# Patient Record
Sex: FEMALE
[De-identification: names, ages, dates, MRNs, and addresses within clinical notes are randomized per-mention and may not be internally consistent; named-entity substitution may affect disease eponyms.]

---

## 2020-09-04 ENCOUNTER — APPOINTMENT (OUTPATIENT)
Dept: MRI IMAGING | Facility: CLINIC | Age: 65
End: 2020-09-04

## 2020-09-05 ENCOUNTER — TRANSCRIPTION ENCOUNTER (OUTPATIENT)
Age: 65
End: 2020-09-05

## 2020-09-14 ENCOUNTER — APPOINTMENT (OUTPATIENT)
Dept: ORTHOPEDIC SURGERY | Facility: CLINIC | Age: 65
End: 2020-09-14

## 2021-02-22 ENCOUNTER — TRANSCRIPTION ENCOUNTER (OUTPATIENT)
Age: 66
End: 2021-02-22

## 2021-08-25 DIAGNOSIS — Z00.00 ENCOUNTER FOR GENERAL ADULT MEDICAL EXAMINATION W/OUT ABNORMAL FINDINGS: ICD-10-CM

## 2021-09-01 ENCOUNTER — APPOINTMENT (OUTPATIENT)
Dept: ORTHOPEDIC SURGERY | Facility: CLINIC | Age: 66
End: 2021-09-01
Payer: MEDICARE

## 2021-09-01 ENCOUNTER — NON-APPOINTMENT (OUTPATIENT)
Age: 66
End: 2021-09-01

## 2021-09-01 VITALS
HEART RATE: 79 BPM | BODY MASS INDEX: 19.44 KG/M2 | HEIGHT: 60 IN | DIASTOLIC BLOOD PRESSURE: 69 MMHG | SYSTOLIC BLOOD PRESSURE: 155 MMHG | WEIGHT: 99 LBS

## 2021-09-01 DIAGNOSIS — M70.61 TROCHANTERIC BURSITIS, RIGHT HIP: ICD-10-CM

## 2021-09-01 DIAGNOSIS — M70.62 TROCHANTERIC BURSITIS, LEFT HIP: ICD-10-CM

## 2021-09-01 PROCEDURE — 99204 OFFICE O/P NEW MOD 45 MIN: CPT

## 2021-09-01 PROCEDURE — 73523 X-RAY EXAM HIPS BI 5/> VIEWS: CPT

## 2021-09-01 NOTE — PHYSICAL EXAM
[de-identified] : Patient is well nourished, well-developed, in no acute distress, with appropriate mood and affect. The patient is oriented to time, place, and person. Respirations are even and unlabored. Gait evaluation does not reveal a limp. There is no inguinal adenopathy.  The right limb is well-perfused and showed 2+ dp/pt pulses, without skin lesions, shows a grossly normal motor and sensory examination. Examination of the hip shows no skin lesions. Hip motion is full and painless from 0-90 degrees extension to flexion, 20 degrees adduction and 20 degrees abduction, and 15 degrees internal and 30 degrees external rotation. FADIR is negative and CRISTÓBAL is negative. Stinchfield test is negative. The left limb is well-perfused and showed 2+ dp/pt pulses, without skin lesions, shows a grossly normal motor and sensory examination. Examination of the hip shows no skin lesions. Hip motion is full and painless from 0-90 degrees extension to flexion, 20 degrees adduction and 20 degrees abduction, and 15 degrees internal and 30 degrees external rotation. FADIR is negative and CRISTÓBAL is negative. Stinchfield test is negative.  Leg lengths are approximately equal. Both hips are stable and muscle strength is normal with good strength with resisted abduction and adduction. Pedal pulses are palpable.  Palpation to the lateral aspect of the hip reproduces pain.  [de-identified] : AP and lateral x-rays of the bilateral hip, pelvis, and femur were ordered and taken in the office and demonstrate no evidence of degenerative joint disease of the hip with maintained joint space and no evidence of fractures or other intraarticular pathology.

## 2021-09-01 NOTE — HISTORY OF PRESENT ILLNESS
[de-identified] : This is a very nice  66 year old  female experiencing pain in the lateral aspect of both hips hip which is moderate - severe in intensity and has been going on for at least over a year. She has had 2 cortisone injections over the last 1-1/2 yrs. She has tried PT, foam roller and NSAIDs as well as Votaren Gel. The pain somewhat limits activities of daily living. Walking tolerance is reduced. Medication and activity modification have been minimally effective. The patient does not report any pain in the groin.  The patient denies any radiation of the pain to the feet and it is not associated with numbness, tingling, or weakness.

## 2021-09-01 NOTE — DISCUSSION/SUMMARY
[de-identified] : The patient has bilat hip GTB They are not an appropriate candidate for surgical intervention at this time. An extensive discussion was conducted on the natural history of the disease and the variety of surgical and non-surgical options available to the patient including, but not limited to non-steroidal anti-inflammatory medications, steroid injections, physical therapy, maintenance of ideal body weight, and reduction of activity. I recommended and prescribed a course of Mobic and physical therapy. The patient will schedule an appointment as needed.

## 2021-09-03 RX ORDER — MELOXICAM 15 MG/1
15 TABLET ORAL
Qty: 30 | Refills: 1 | Status: ACTIVE | COMMUNITY
Start: 2021-09-03 | End: 1900-01-01

## 2022-07-06 ENCOUNTER — NON-APPOINTMENT (OUTPATIENT)
Age: 67
End: 2022-07-06

## 2022-11-09 ENCOUNTER — APPOINTMENT (OUTPATIENT)
Dept: ORTHOPEDIC SURGERY | Facility: CLINIC | Age: 67
End: 2022-11-09

## 2022-11-09 PROCEDURE — 99213 OFFICE O/P EST LOW 20 MIN: CPT

## 2022-11-09 PROCEDURE — 73140 X-RAY EXAM OF FINGER(S): CPT | Mod: RT

## 2022-11-09 PROCEDURE — 99203 OFFICE O/P NEW LOW 30 MIN: CPT

## 2022-11-09 NOTE — IMAGING
[de-identified] : Right thumb\par Palpable nodule located at the level of the A1 pulley, TTP\par No erythema, swelling or wounds\par Normal muscle tone/ bulk\par Triggering is observed\par + FPL/ EPL\par SILT throughout\par wwp\par

## 2022-11-09 NOTE — DISCUSSION/SUMMARY
[Medication Risks Reviewed] : Medication risks reviewed [Surgical risks reviewed] : Surgical risks reviewed [de-identified] : - discussed etiology/ pathophysiology of trigger thumb\par - reviewed treatment options, conservative and operative\par - reviewed r/b/a to these\par - pt request Right trigger injection today\par - f/u in 2 wks\par

## 2022-11-09 NOTE — HISTORY OF PRESENT ILLNESS
[Sudden] : sudden [4] : 4 [1] : 2 [Dull/Aching] : dull/aching [Localized] : localized [de-identified] : 66yo RHD F w/ 3 weeks of right thumb pain and triggering. No injuries, n/t. [] : no [FreeTextEntry1] : right thumb  [FreeTextEntry5] : pain started about a month ago after starting golf lessons. No prior history of pain in the right hand

## 2022-11-09 NOTE — PROCEDURE
[Tendon Sheath] : tendon sheath [Right] : of the right [1st] : 1st trigger finger [Pain] : pain [Inflammation] : inflammation [Alcohol] : alcohol [Ethyl Chloride sprayed topically] : ethyl chloride sprayed topically [Sterile technique used] : sterile technique used [___ cc    1%] : Lidocaine ~Vcc of 1%  [___ cc    40mg] : Triamcinolone (Kenalog) ~Vcc of 40 mg  [Call if redness, pain or fever occur] : call if redness, pain or fever occur [Apply ice for 15min out of every hour for the next 12-24 hours as tolerated] : apply ice for 15 minutes out of every hour for the next 12-24 hours as tolerated [Previous OTC use and PT nontherapeutic] : patient has tried OTC's including aspirin, Ibuprofen, Aleve, etc or prescription NSAIDS, and/or exercises at home and/or physical therapy without satisfactory response [Patient had decreased mobility in the joint] : patient had decreased mobility in the joint [Risks, benefits, alternatives discussed / Verbal consent obtained] : the risks benefits, and alternatives have been discussed, and verbal consent was obtained

## 2022-11-09 NOTE — DATA REVIEWED
[FreeTextEntry1] : 3 views of the right thumb were obtained in the office today and independently evaluated without evidence of acute fracture or malalignment.\par

## 2022-11-23 ENCOUNTER — APPOINTMENT (OUTPATIENT)
Dept: ORTHOPEDIC SURGERY | Facility: CLINIC | Age: 67
End: 2022-11-23

## 2022-11-23 PROCEDURE — 99213 OFFICE O/P EST LOW 20 MIN: CPT

## 2022-11-27 NOTE — HISTORY OF PRESENT ILLNESS
[Sudden] : sudden [1] : 2 [Dull/Aching] : dull/aching [Localized] : localized [de-identified] : 11/09/22- 68yo RHD F w/ 3 weeks of right thumb pain and triggering. No injuries, n/t.\par \par 11/23/22- Pain has resolved, she continues to notice clicking/triggering. [] : no [FreeTextEntry1] : right thumb  [FreeTextEntry5] : 11/23/2022 Pt states Rt  Thumb is improving since last visit, pt states Rt Thumb is still clicking at times. Pt states Rt Hand is staying stuck at times  \par \par pain started about a month ago after starting golf lessons. No prior history of pain in the right hand

## 2022-11-27 NOTE — DISCUSSION/SUMMARY
[Medication Risks Reviewed] : Medication risks reviewed [Surgical risks reviewed] : Surgical risks reviewed [de-identified] : - discussed etiology/ pathophysiology of trigger thumb\par - reviewed treatment options, conservative and operative\par - reviewed r/b/a to these\par - pt pleased with her painless motion despite persistent clicking/triggering\par - could consider surgical management in the future should these symptoms worsen or become bothersome\par - f/u prn\par

## 2022-11-27 NOTE — IMAGING
[de-identified] : Right thumb\par Palpable nodule located at the level of the A1 pulley, NTTP\par No erythema, swelling or wounds\par Normal muscle tone/ bulk\par Triggering is observed intermittently\par + FPL/ EPL\par SILT throughout\par wwp\par 
Detail Level: Zone

## 2022-12-05 ENCOUNTER — NON-APPOINTMENT (OUTPATIENT)
Age: 67
End: 2022-12-05

## 2022-12-06 ENCOUNTER — APPOINTMENT (OUTPATIENT)
Dept: ORTHOPEDIC SURGERY | Facility: CLINIC | Age: 67
End: 2022-12-06
Payer: MEDICARE

## 2022-12-06 DIAGNOSIS — M79.672 PAIN IN RIGHT FOOT: ICD-10-CM

## 2022-12-06 DIAGNOSIS — M79.671 PAIN IN RIGHT FOOT: ICD-10-CM

## 2022-12-06 PROCEDURE — 99213 OFFICE O/P EST LOW 20 MIN: CPT

## 2022-12-06 PROCEDURE — 73650 X-RAY EXAM OF HEEL: CPT | Mod: 50

## 2022-12-06 PROCEDURE — 99203 OFFICE O/P NEW LOW 30 MIN: CPT

## 2022-12-06 NOTE — IMAGING
[Bilateral] : calcaneus bilaterally [Weight -] : weightbearing [de-identified] : Insertional achilles spur on the left side.

## 2022-12-06 NOTE — HISTORY OF PRESENT ILLNESS
[de-identified] : 12/6/22: Patient is a 67 year old F who presents today for evaluation of their B/L feet (L>R). Pt states that her heels are achy especially when in bed, and recently started to be painful during the day that’s progressed over the past 6 months. Seen by dermatology and podiatry without any answers. Pain localized along the heels. Denies trauma/previous injury. No numbness/tingling. IBU PRN with relief. No formal treatment to date. WB in sneakers. H/o back issues.  [] : Post Surgical Visit: no [FreeTextEntry1] : B/L Feet

## 2022-12-06 NOTE — DISCUSSION/SUMMARY
[de-identified] : B/L heel pain > 6 months seen by podiatry and dermatology\par MRI B/L ankles r/o insertional achilles tear vs stress fracture of the calcaneous \par HEP encouraged\par f/u after imaging

## 2022-12-06 NOTE — PHYSICAL EXAM
[Bilateral] : foot and ankle bilaterally [NL (40)] : plantar flexion 40 degrees [NL 30)] : inversion 30 degrees [NL (20)] : eversion 20 degrees [2+] : dorsalis pedis pulse: 2+ [] : no lisfranc's joint tenderness

## 2022-12-08 ENCOUNTER — FORM ENCOUNTER (OUTPATIENT)
Age: 67
End: 2022-12-08

## 2022-12-09 ENCOUNTER — APPOINTMENT (OUTPATIENT)
Dept: MRI IMAGING | Facility: CLINIC | Age: 67
End: 2022-12-09

## 2022-12-09 PROCEDURE — 73721 MRI JNT OF LWR EXTRE W/O DYE: CPT | Mod: LT,MH

## 2022-12-20 ENCOUNTER — APPOINTMENT (OUTPATIENT)
Dept: ORTHOPEDIC SURGERY | Facility: CLINIC | Age: 67
End: 2022-12-20
Payer: MEDICARE

## 2022-12-20 DIAGNOSIS — M76.62 ACHILLES TENDINITIS, LEFT LEG: ICD-10-CM

## 2022-12-20 DIAGNOSIS — M76.61 ACHILLES TENDINITIS, RIGHT LEG: ICD-10-CM

## 2022-12-20 DIAGNOSIS — M85.80 OTHER SPECIFIED DISORDERS OF BONE DENSITY AND STRUCTURE, UNSPECIFIED SITE: ICD-10-CM

## 2022-12-20 PROCEDURE — 99214 OFFICE O/P EST MOD 30 MIN: CPT

## 2022-12-20 PROCEDURE — L4397: CPT | Mod: KX,LT

## 2022-12-20 NOTE — DISCUSSION/SUMMARY
[de-identified] : B/L heel pain > 6 months seen by podiatry and dermatology\par heel cushions, night splinting discussed\par f/u prn \par

## 2022-12-20 NOTE — IMAGING
[Bilateral] : calcaneus bilaterally [Weight -] : weightbearing [de-identified] : Insertional achilles spur on the left side.

## 2022-12-20 NOTE — HISTORY OF PRESENT ILLNESS
[de-identified] : 12/6/22: Patient is a 67 year old F who presents today for evaluation of their B/L feet (L>R). Pt states that her heels are achy especially when in bed, and recently started to be painful during the day that’s progressed over the past 6 months. Seen by dermatology and podiatry without any answers. Pain localized along the heels. Denies trauma/previous injury. No numbness/tingling. IBU PRN with relief. No formal treatment to date. WB in sneakers. H/o back issues. \par 12/20/22: F/U B/L feet- presents today for MRI results. Still with aching to the heels at night.  [] : Post Surgical Visit: no [FreeTextEntry1] : B/L Feet

## 2022-12-20 NOTE — DATA REVIEWED
[MRI] : MRI [Ankle] : ankle [I independently reviewed and interpreted images and report] : I independently reviewed and interpreted images and report [I reviewed the films/CD and agree] : I reviewed the films/CD and agree [Bilateral] : of the bilateral [Bone Density] : bone density [FreeTextEntry1] : 12/9/22 LEFT ANKLE: 1. 5 mm nondisplaced fracture at the distal tip of the fibula with marrow edema.\par 2. Stable 3 mm x 5 mm osteochondral defect of the lateral talar dome.\par 3. Subacute partial tear of anterior talofibular and calcaneofibular ligaments with subacute sprain of deep fibers \par of the deltoid with traction edema, insertion at the medial talar body.\par 4. 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 Posterior tibial tendinopathy with tenosynovitis. Accessory navicular bone with arthrosis. No fracture.\par 6. Peroneal tendinopathy with tenosynovitis.\par 7. Achilles tendinopathy with peritendinous edema and bursitis. [FreeTextEntry2] : 12/9/22 RIGHT ANKLE: 1. Scarring of the tarsal sinus ligaments and fat with edema throughout the sinus and 15 mm ganglion at the \par dorsal lateral margin of the sinus contiguous with the undersurface of the extensor digitorum myotendinous \par junction. 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 Lateral ligament sprains.\par 3. Achilles linear tendinopathy at and proximal to the insertion and with peritendinous edema.\par 4. Plantar fascial degeneration at the origin with soft tissue edema and 4 mm spur with no fracture or tear.\par 5. No fracture. [FreeTextEntry3] : 9/11/21: osteopenia

## 2023-11-04 ENCOUNTER — NON-APPOINTMENT (OUTPATIENT)
Age: 68
End: 2023-11-04

## 2024-04-04 ENCOUNTER — APPOINTMENT (OUTPATIENT)
Dept: ORTHOPEDIC SURGERY | Facility: CLINIC | Age: 69
End: 2024-04-04
Payer: MEDICARE

## 2024-04-04 VITALS — WEIGHT: 99 LBS | BODY MASS INDEX: 19.44 KG/M2 | HEIGHT: 60 IN

## 2024-04-04 PROCEDURE — 73140 X-RAY EXAM OF FINGER(S): CPT | Mod: RT

## 2024-04-04 PROCEDURE — 99213 OFFICE O/P EST LOW 20 MIN: CPT

## 2024-04-04 NOTE — HISTORY OF PRESENT ILLNESS
[de-identified] : 04/04/2024 :REUBEN BROWN , a 69 year old female, presents today for right ring finger pain. Patient states she cannot bend the right middle finger.

## 2024-04-04 NOTE — ASSESSMENT
[FreeTextEntry1] : The patient was advised of the diagnosis. The natural history of the pathology was explained in full to the patient in layman's terms. All questions were answered. The risks and benefits of surgical and non-surgical treatment alternatives were explained in full to the patient.  Recommended splint F/u in 4-6 weeks

## 2024-05-16 ENCOUNTER — APPOINTMENT (OUTPATIENT)
Dept: ORTHOPEDIC SURGERY | Facility: CLINIC | Age: 69
End: 2024-05-16
Payer: MEDICARE

## 2024-05-16 DIAGNOSIS — M66.241 SPONTANEOUS RUPTURE OF EXTENSOR TENDONS, RIGHT HAND: ICD-10-CM

## 2024-05-16 PROCEDURE — 99213 OFFICE O/P EST LOW 20 MIN: CPT

## 2024-05-16 NOTE — IMAGING
[de-identified] : right ring finger subluxation no ttp over A1 pulley no triggering farom nvid
Patient

## 2024-05-16 NOTE — ASSESSMENT
[FreeTextEntry1] : The patient was advised of the diagnosis. The natural history of the pathology was explained in full to the patient in layman's terms. All questions were answered. The risks and benefits of surgical and non-surgical treatment alternatives were explained in full to the patient.  Pt will consider surgery

## 2024-05-16 NOTE — HISTORY OF PRESENT ILLNESS
[de-identified] : 5/16/24:  Pt has been wearing splint and still has subluxation but it is better  04/04/2024 :REUBEN BROWN , a 69 year old female, presents today for right ring finger pain. Patient states she cannot bend the right middle finger.

## 2024-06-12 ENCOUNTER — APPOINTMENT (OUTPATIENT)
Dept: ORTHOPEDIC SURGERY | Facility: CLINIC | Age: 69
End: 2024-06-12
Payer: MEDICARE

## 2024-06-12 VITALS — BODY MASS INDEX: 19.24 KG/M2 | HEIGHT: 60 IN | WEIGHT: 98 LBS

## 2024-06-12 DIAGNOSIS — S93.601S UNSPECIFIED SPRAIN OF RIGHT FOOT, SEQUELA: ICD-10-CM

## 2024-06-12 PROCEDURE — 99203 OFFICE O/P NEW LOW 30 MIN: CPT

## 2024-06-12 PROCEDURE — 73630 X-RAY EXAM OF FOOT: CPT | Mod: RT

## 2024-06-12 RX ORDER — ATORVASTATIN CALCIUM 80 MG/1
TABLET, FILM COATED ORAL
Refills: 0 | Status: ACTIVE | COMMUNITY

## 2024-06-12 RX ORDER — FOSINOPRIL SODIUM 40 MG/1
TABLET ORAL
Refills: 0 | Status: ACTIVE | COMMUNITY

## 2024-06-12 RX ORDER — LEVOTHYROXINE SODIUM 0.17 MG/1
TABLET ORAL
Refills: 0 | Status: ACTIVE | COMMUNITY

## 2024-06-12 NOTE — IMAGING
Quality 226: Preventive Care And Screening: Tobacco Use: Screening And Cessation Intervention: Patient screened for tobacco use, is a smoker AND received Cessation Counseling within measurement period or in the six months prior to the measurement period
[de-identified] : R foot mid foot swelling tender 2nd and 3rd MT FAROM NVI non antalgic gait
Detail Level: Detailed

## 2024-06-12 NOTE — HISTORY OF PRESENT ILLNESS
[de-identified] : pt slipping going up stairs today, injured r foot. pain began later in the day. pain on the top of the foot. pain worsens when laying. pain to walk. relief with ice and ibuprofen  pain level 7 [] : no

## 2024-06-27 ENCOUNTER — APPOINTMENT (OUTPATIENT)
Dept: ORTHOPEDIC SURGERY | Facility: CLINIC | Age: 69
End: 2024-06-27
Payer: MEDICARE

## 2024-06-27 DIAGNOSIS — M65.311 TRIGGER THUMB, RIGHT THUMB: ICD-10-CM

## 2024-06-27 PROCEDURE — 20550 NJX 1 TENDON SHEATH/LIGAMENT: CPT | Mod: RT

## 2024-06-27 PROCEDURE — 99214 OFFICE O/P EST MOD 30 MIN: CPT | Mod: 25

## 2024-07-15 ENCOUNTER — APPOINTMENT (OUTPATIENT)
Dept: ORTHOPEDIC SURGERY | Facility: CLINIC | Age: 69
End: 2024-07-15

## 2024-07-15 VITALS — BODY MASS INDEX: 19.76 KG/M2 | HEIGHT: 59 IN | WEIGHT: 98 LBS

## 2024-07-15 DIAGNOSIS — L03.115 CELLULITIS OF RIGHT LOWER LIMB: ICD-10-CM

## 2024-07-15 PROCEDURE — 99214 OFFICE O/P EST MOD 30 MIN: CPT

## 2024-07-15 PROCEDURE — 73564 X-RAY EXAM KNEE 4 OR MORE: CPT | Mod: RT

## 2024-07-15 RX ORDER — CEPHALEXIN 500 MG/1
500 CAPSULE ORAL 4 TIMES DAILY
Qty: 40 | Refills: 1 | Status: ACTIVE | COMMUNITY
Start: 2024-07-15 | End: 1900-01-01

## 2024-08-30 ENCOUNTER — APPOINTMENT (OUTPATIENT)
Dept: ORTHOPEDIC SURGERY | Facility: CLINIC | Age: 69
End: 2024-08-30

## 2024-08-30 PROCEDURE — 99213 OFFICE O/P EST LOW 20 MIN: CPT

## 2024-08-30 PROCEDURE — 73564 X-RAY EXAM KNEE 4 OR MORE: CPT | Mod: LT

## 2024-08-30 RX ORDER — MELOXICAM 15 MG/1
15 TABLET ORAL DAILY
Qty: 30 | Refills: 1 | Status: ACTIVE | COMMUNITY
Start: 2024-08-30 | End: 1900-01-01

## 2024-08-31 ENCOUNTER — APPOINTMENT (OUTPATIENT)
Dept: MRI IMAGING | Facility: CLINIC | Age: 69
End: 2024-08-31

## 2024-08-31 PROCEDURE — 73721 MRI JNT OF LWR EXTRE W/O DYE: CPT | Mod: LT,MH

## 2024-09-03 ENCOUNTER — APPOINTMENT (OUTPATIENT)
Dept: ORTHOPEDIC SURGERY | Facility: CLINIC | Age: 69
End: 2024-09-03
Payer: MEDICARE

## 2024-09-03 VITALS — HEIGHT: 59 IN | WEIGHT: 98 LBS | BODY MASS INDEX: 19.76 KG/M2

## 2024-09-03 DIAGNOSIS — S83.512D SPRAIN OF ANTERIOR CRUCIATE LIGAMENT OF LEFT KNEE, SUBSEQUENT ENCOUNTER: ICD-10-CM

## 2024-09-03 DIAGNOSIS — S83.242A OTHER TEAR OF MEDIAL MENISCUS, CURRENT INJURY, LEFT KNEE, INITIAL ENCOUNTER: ICD-10-CM

## 2024-09-03 DIAGNOSIS — M23.92 UNSPECIFIED INTERNAL DERANGEMENT OF LEFT KNEE: ICD-10-CM

## 2024-09-03 PROCEDURE — J3490M: CUSTOM | Mod: NC,JZ

## 2024-09-03 PROCEDURE — 99214 OFFICE O/P EST MOD 30 MIN: CPT

## 2024-09-03 PROCEDURE — 20611 DRAIN/INJ JOINT/BURSA W/US: CPT | Mod: LT

## 2024-09-04 ENCOUNTER — APPOINTMENT (OUTPATIENT)
Dept: ORTHOPEDIC SURGERY | Facility: CLINIC | Age: 69
End: 2024-09-04

## 2024-09-10 PROBLEM — S83.512D RUPTURE OF ANTERIOR CRUCIATE LIGAMENT OF LEFT KNEE, SUBSEQUENT ENCOUNTER: Status: ACTIVE | Noted: 2024-09-10

## 2024-09-10 NOTE — PROCEDURE
[FreeTextEntry3] : Procedure Note: Musculoskeletal Injection Diagnosis: Left knee OA and meniscal tearing s/p ACL reconstruction  Procedure: Left knee, superolateral, 9/2 Celestone injection under US guidance    Indication:  The patient has had persistent pain despite conservative treatment.  Risks, benefits and alternatives to procedure were discussed; all questions were answered to the patient's apparent satisfaction and informed consent obtained.  The patient denied prior problems with local anesthetics, injectable cortisones, chicken allergy, coagulopathy and no relevant drug or preservative allergies or sensitivities.   The area of injection was prepared in a sterile fashion.  Prior to injection a 'Time Out' was conducted in accordance with [facility] policy and the site and nature of procedure verified with the patient.   Procedure: The procedure was carried out utilizing sterile technique from a superolateral arthroscopic portal position. The needle was placed under ultrasound guidance to improve accuracy and minimize risk to the patient and diagnostic ultrasound in the long and short axis revealed surgical components well fixed    Ultrasound Indication: body habitus     0cc of clear synovial fluid was aspirated. The specimen: (X) appeared benign and was discarded ( ) was sent for Culture / Cell Count / Crystal analysis / [_].]   Injection into the target area with care taken to aspirate frequently to minimize the risk of intravascular injection was performed with: ( ) 1cc of Depomedrol (80mg/ml) (X) 2cc of Betamethasone (Celestone) (6mg/ml) ( ) 1cc of Toradol (30mg/ml) (X) 9cc of 0.5% Bupivacaine ( ) 1cc of 1% Lidocaine   Patient tolerated the procedure well and direct pressure was applied for hemostasis. The patient was reminded of potential post-injection risks including, but not limited to, delayed hypersensitivity reactions and/or infection.  The patient verified that they had the office and the Emergency Room's contact information if any problems should arise.  After several minutes, the patient informed me that they felt fine and was released from the office.

## 2024-09-10 NOTE — PROCEDURE
[FreeTextEntry3] : Procedure Note: Musculoskeletal Injection Diagnosis: Left knee OA and meniscal tearing s/p ACL reconstruction  Procedure: Left knee, superolateral, 9/2 Celestone injection under US guidance    Indication:  The patient has had persistent pain despite conservative treatment.  Risks, benefits and alternatives to procedure were discussed; all questions were answered to the patient's apparent satisfaction and informed consent obtained.  The patient denied prior problems with local anesthetics, injectable cortisones, chicken allergy, coagulopathy and no relevant drug or preservative allergies or sensitivities.   The area of injection was prepared in a sterile fashion.  Prior to injection a 'Time Out' was conducted in accordance with [facility] policy and the site and nature of procedure verified with the patient.   Procedure: The procedure was carried out utilizing sterile technique from a superolateral arthroscopic portal position. The needle was placed under ultrasound guidance to improve accuracy and minimize risk to the patient and diagnostic ultrasound in the long and short axis revealed surgical components well fixed    Ultrasound Indication: body habitus     0cc of clear synovial fluid was aspirated. The specimen: (X) appeared benign and was discarded ( ) was sent for Culture / Cell Count / Crystal analysis / [_].]   Injection into the target area with care taken to aspirate frequently to minimize the risk of intravascular injection was performed with: ( ) 1cc of Depomedrol (80mg/ml) (X) 2cc of Betamethasone (Celestone) (6mg/ml) ( ) 1cc of Toradol (30mg/ml) (X) 9cc of 0.5% Bupivacaine ( ) 1cc of 1% Lidocaine   Patient tolerated the procedure well and direct pressure was applied for hemostasis. The patient was reminded of potential post-injection risks including, but not limited to, delayed hypersensitivity reactions and/or infection.  The patient verified that they had the office and the Emergency Room's contact information if any problems should arise.  After several minutes, the patient informed me that they felt fine and was released from the office.

## 2024-09-10 NOTE — DISCUSSION/SUMMARY
[Medication Risks Reviewed] : Medication risks reviewed [Surgical risks reviewed] : Surgical risks reviewed [de-identified] : I spoke with the urgent care provider, reviewed notes, and images.  S/p left knee ACL reconstruction with allograft and meniscectomy with Dr. Arechiga. Her surgeon has since passed away therefore is following up here.  Tests/Studies Independently Interpreted Today: X-Ray left knee reveals evidence of mild degenerative changes. MRI left knee reveals evidence of previous ACL reconstruction with signal about medial and lateral meniscus, joint effusion, no obvious ACL recurrent tearing.   Advised the patient of the poor radiographic imaging complicated by metal interference screws. Upon clinical examination, the patients anterior cruciate ligament is stable and she has a solid lachman.   Upon clinical examination, a majority of the patients discomfort appears to be related to meniscal tearing and an acute aggravation of osteoarthritis. We discussed the surgical options to include an arthroscopic meniscectomy however, due to risks the patient decides to defer and will continue with current course. Higher surgical risk given her history and age. Reviewed the lower success rate of ACL failure with allograft tissue.   Recommended treating with a diagnostic and therapeutic injection to further guide indefinite treatment.   Discussed treatment options in the form of steroid injection therapy to temporarily aid in pain and inflammation. The risks, benefits and contents of the injection have been discussed. Risks include but are not limited to allergic reaction, flare reaction, permanent white skin discoloration at the injection site and infection.  The patient understands the risks and agrees to having the injection.  All questions have been answered.  Patient elected to receive LEFT KNEE SUPEROLATERAL 9/2 CELESTONE INJECTION under ultrasound guidance. Advised patient to rest and ice the area. Discussed the timing of the injections and the follow up that is needed. Advised the patient to ice the area that was injected and that it may take a few days for the injection to provide relief.  Prescribed patient Meloxicam 15mg daily and discussed risks of side effects, as well as timing/management of medication.  Side effects can include but are not limited to gastrointestinal ulcers and irritation, kidney failure, and bleeding issues. Use as directed and take with food to manage pain, inflammation, and discomfort.ndent on response to injection, we will discuss proceeding with an arthroscopic meniscectomy.

## 2024-09-10 NOTE — DISCUSSION/SUMMARY
[Medication Risks Reviewed] : Medication risks reviewed [Surgical risks reviewed] : Surgical risks reviewed [de-identified] : I spoke with the urgent care provider, reviewed notes, and images.  S/p left knee ACL reconstruction with allograft and meniscectomy with Dr. Arechiga. Her surgeon has since passed away therefore is following up here.  Tests/Studies Independently Interpreted Today: X-Ray left knee reveals evidence of mild degenerative changes. MRI left knee reveals evidence of previous ACL reconstruction with signal about medial and lateral meniscus, joint effusion, no obvious ACL recurrent tearing.   Advised the patient of the poor radiographic imaging complicated by metal interference screws. Upon clinical examination, the patients anterior cruciate ligament is stable and she has a solid lachman.   Upon clinical examination, a majority of the patients discomfort appears to be related to meniscal tearing and an acute aggravation of osteoarthritis. We discussed the surgical options to include an arthroscopic meniscectomy however, due to risks the patient decides to defer and will continue with current course. Higher surgical risk given her history and age. Reviewed the lower success rate of ACL failure with allograft tissue.   Recommended treating with a diagnostic and therapeutic injection to further guide indefinite treatment.   Discussed treatment options in the form of steroid injection therapy to temporarily aid in pain and inflammation. The risks, benefits and contents of the injection have been discussed. Risks include but are not limited to allergic reaction, flare reaction, permanent white skin discoloration at the injection site and infection.  The patient understands the risks and agrees to having the injection.  All questions have been answered.  Patient elected to receive LEFT KNEE SUPEROLATERAL 9/2 CELESTONE INJECTION under ultrasound guidance. Advised patient to rest and ice the area. Discussed the timing of the injections and the follow up that is needed. Advised the patient to ice the area that was injected and that it may take a few days for the injection to provide relief.  Prescribed patient Meloxicam 15mg daily and discussed risks of side effects, as well as timing/management of medication.  Side effects can include but are not limited to gastrointestinal ulcers and irritation, kidney failure, and bleeding issues. Use as directed and take with food to manage pain, inflammation, and discomfort.ndent on response to injection, we will discuss proceeding with an arthroscopic meniscectomy.

## 2024-09-10 NOTE — HISTORY OF PRESENT ILLNESS
[de-identified] : Consult from ISABELA Renae for left knee discomfort following yoga class a couple weeks back but no injury to the area. Had ACL repair with a Dr Juarez Arechiga @ [facility] about 11 months ago, MD has since passed away.

## 2024-09-10 NOTE — PHYSICAL EXAM
[4___] : quadriceps 4[unfilled]/5 [Left] : left knee [All Views] : anteroposterior, lateral, skyline, and anteroposterior standing [] : no crepitus [FreeTextEntry3] : Well healed surgical incision s/p ACL reconstruction  [de-identified] : Quad atrophy and weakness  [de-identified] : Solid lachman, +medial alec  [FreeTextEntry9] : X-Ray left knee reveals evidence of mild degenerative changes.

## 2024-09-10 NOTE — PROCEDURE
[FreeTextEntry3] : Procedure Note: Musculoskeletal Injection Diagnosis: Left knee OA and meniscal tearing s/p ACL reconstruction  Procedure: Left knee, superolateral, 9/2 Celestone injection under US guidance    Indication:  The patient has had persistent pain despite conservative treatment.  Risks, benefits and alternatives to procedure were discussed; all questions were answered to the patient's apparent satisfaction and informed consent obtained.  The patient denied prior problems with local anesthetics, injectable cortisones, chicken allergy, coagulopathy and no relevant drug or preservative allergies or sensitivities.   The area of injection was prepared in a sterile fashion.  Prior to injection a 'Time Out' was conducted in accordance with [facility] policy and the site and nature of procedure verified with the patient.   Procedure: The procedure was carried out utilizing sterile technique from a superolateral arthroscopic portal position. The needle was placed under ultrasound guidance to improve accuracy and minimize risk to the patient and diagnostic ultrasound in the long and short axis revealed surgical components well fixed    Ultrasound Indication: body habitus     0cc of clear synovial fluid was aspirated. The specimen: (X) appeared benign and was discarded ( ) was sent for Culture / Cell Count / Crystal analysis / [_].]   Injection into the target area with care taken to aspirate frequently to minimize the risk of intravascular injection was performed with: ( ) 1cc of Depomedrol (80mg/ml) (X) 2cc of Betamethasone (Celestone) (6mg/ml) ( ) 1cc of Toradol (30mg/ml) (X) 9cc of 0.5% Bupivacaine ( ) 1cc of 1% Lidocaine   Patient tolerated the procedure well and direct pressure was applied for hemostasis. The patient was reminded of potential post-injection risks including, but not limited to, delayed hypersensitivity reactions and/or infection.  The patient verified that they had the office and the Emergency Room's contact information if any problems should arise.  After several minutes, the patient informed me that they felt fine and was released from the office.

## 2024-09-10 NOTE — PHYSICAL EXAM
[4___] : quadriceps 4[unfilled]/5 [Left] : left knee [All Views] : anteroposterior, lateral, skyline, and anteroposterior standing [] : no crepitus [FreeTextEntry3] : Well healed surgical incision s/p ACL reconstruction  [de-identified] : Quad atrophy and weakness  [de-identified] : Solid lachman, +medial alec  [FreeTextEntry9] : X-Ray left knee reveals evidence of mild degenerative changes.

## 2024-09-10 NOTE — DATA REVIEWED
[MRI] : MRI [Left] : left [Knee] : knee [Report was reviewed and noted in the chart] : The report was reviewed and noted in the chart [I independently reviewed and interpreted images and report] : I independently reviewed and interpreted images and report [I reviewed the films/CD] : I reviewed the films/CD [FreeTextEntry1] : MRI left knee reveals evidence of previous ACL reconstruction with signal about medial and lateral meniscus, joint effusion, no obvious ACL recurrent tearing

## 2024-09-10 NOTE — HISTORY OF PRESENT ILLNESS
[de-identified] : Consult from ISABELA Renae for left knee discomfort following yoga class a couple weeks back but no injury to the area. Had ACL repair with a Dr Juarez Arechiga @ [facility] about 11 months ago, MD has since passed away.

## 2024-09-10 NOTE — DISCUSSION/SUMMARY
[Medication Risks Reviewed] : Medication risks reviewed [Surgical risks reviewed] : Surgical risks reviewed [de-identified] : I spoke with the urgent care provider, reviewed notes, and images.  S/p left knee ACL reconstruction with allograft and meniscectomy with Dr. Arechiga. Her surgeon has since passed away therefore is following up here.  Tests/Studies Independently Interpreted Today: X-Ray left knee reveals evidence of mild degenerative changes. MRI left knee reveals evidence of previous ACL reconstruction with signal about medial and lateral meniscus, joint effusion, no obvious ACL recurrent tearing.   Advised the patient of the poor radiographic imaging complicated by metal interference screws. Upon clinical examination, the patients anterior cruciate ligament is stable and she has a solid lachman.   Upon clinical examination, a majority of the patients discomfort appears to be related to meniscal tearing and an acute aggravation of osteoarthritis. We discussed the surgical options to include an arthroscopic meniscectomy however, due to risks the patient decides to defer and will continue with current course. Higher surgical risk given her history and age. Reviewed the lower success rate of ACL failure with allograft tissue.   Recommended treating with a diagnostic and therapeutic injection to further guide indefinite treatment.   Discussed treatment options in the form of steroid injection therapy to temporarily aid in pain and inflammation. The risks, benefits and contents of the injection have been discussed. Risks include but are not limited to allergic reaction, flare reaction, permanent white skin discoloration at the injection site and infection.  The patient understands the risks and agrees to having the injection.  All questions have been answered.  Patient elected to receive LEFT KNEE SUPEROLATERAL 9/2 CELESTONE INJECTION under ultrasound guidance. Advised patient to rest and ice the area. Discussed the timing of the injections and the follow up that is needed. Advised the patient to ice the area that was injected and that it may take a few days for the injection to provide relief.  Prescribed patient Meloxicam 15mg daily and discussed risks of side effects, as well as timing/management of medication.  Side effects can include but are not limited to gastrointestinal ulcers and irritation, kidney failure, and bleeding issues. Use as directed and take with food to manage pain, inflammation, and discomfort.ndent on response to injection, we will discuss proceeding with an arthroscopic meniscectomy.

## 2024-09-10 NOTE — PHYSICAL EXAM
[4___] : quadriceps 4[unfilled]/5 [Left] : left knee [All Views] : anteroposterior, lateral, skyline, and anteroposterior standing [] : no crepitus [FreeTextEntry3] : Well healed surgical incision s/p ACL reconstruction  [de-identified] : Quad atrophy and weakness  [de-identified] : Solid lachman, +medial alec  [FreeTextEntry9] : X-Ray left knee reveals evidence of mild degenerative changes.

## 2024-09-20 ENCOUNTER — APPOINTMENT (OUTPATIENT)
Dept: ORTHOPEDIC SURGERY | Facility: CLINIC | Age: 69
End: 2024-09-20
Payer: MEDICARE

## 2024-09-20 VITALS
BODY MASS INDEX: 19.76 KG/M2 | SYSTOLIC BLOOD PRESSURE: 119 MMHG | HEART RATE: 81 BPM | WEIGHT: 98 LBS | DIASTOLIC BLOOD PRESSURE: 77 MMHG | HEIGHT: 59 IN

## 2024-09-20 DIAGNOSIS — M25.551 PAIN IN RIGHT HIP: ICD-10-CM

## 2024-09-20 DIAGNOSIS — M25.552 PAIN IN RIGHT HIP: ICD-10-CM

## 2024-09-20 PROCEDURE — 73502 X-RAY EXAM HIP UNI 2-3 VIEWS: CPT

## 2024-09-20 PROCEDURE — 99203 OFFICE O/P NEW LOW 30 MIN: CPT

## 2024-09-20 NOTE — PHYSICAL EXAM
[DP] : dorsalis pedis 2+ and symmetric bilaterally [PT] : posterior tibial 2+ and symmetric bilaterally [Normal] : Alert and in no acute distress [Poor Appearance] : well-appearing [Acute Distress] : not in acute distress [Obese] : not obese [de-identified] : The patient has no respiratory distress. Mood and affect are normal. The patient is alert and oriented to person, place and time. Examination of the lumbar spine demonstrates no tenderness, no deformity and no muscle spasm. Lumbar spine range of motion is 90 of flexion, 10 of right and left lateral flexion. Neurologic exam of the lower extremities reveals intact sensation to light touch. Motor function is 5 over 5 in all groups. Deep tendon reflexes are 2+ and equal at the knee and ankle. Plantar reflexes are normal. Straight leg raise test is negative bilaterally. Trendelenburg test is negative. Examination of the hips demonstrates tenderness over the greater trochanter, worse on the left than the right.  There is no pain with active or passive rotation of the hips.  Motor strength of both hips is intact.  There is no pain with knee range of motion.  Quadriceps and hamstring function are intact.  Calves are soft and nontender.  The skin is intact.  There is no lymphedema. [de-identified] : MRI-RIGHT HIP NON CONTRAST  HISTORY: M25.551 Right hip pain  TECHNIQUE: MR Imaging of the right hip performed using multiplanar multisequence technique. Additionally, a coronal STIR sequence through the entire pelvis was acquired.  COMPARISON: No prior studies are available for direct comparison at the time of this interpretation.  FINDINGS:  Osseous Structures: Mild degenerative changes are present with superficial cartilage loss involving less than 50% the cartilage thickness. There is no evidence for fractures or avascular necrosis. Degenerative changes are present in the visualized portion of the lower lumbar spine. The sacroiliac joints are unremarkable. Overall, the bone marrow signal is age-appropriate. Bony proliferation is present on the anterolateral aspect of the femoral head/neck junction. No acetabular retroversion is identified.  Effusion: No significant hip joint effusion is present.  Labrum: A nondisplaced tear of the lateral aspect right acetabular labrum is present.  Tendons: Mild gluteus minimus tendinosis is present, without tears. The origin of the hamstring tendons is unremarkable. The iliopsoas tendon is unremarkable. No significant amount of fluid is present in the bursae.  There is no evidence for inguinal adenopathy. The sciatic nerve has a normal appearance.  IMPRESSION:   Mild degenerative changes of the right hip. Nondisplaced tear of the lateral aspect of the right acetabular labrum. Mild gluteus minimus tendinosis, without tears.  Signed by: Tristan Calderon MD Signed Date: 11/20/2021 3:04 PM EST   MRI-LEFT HIP NON CONTRAST  HISTORY: M25.552 Left hip pain  TECHNIQUE: MR Imaging of the left hip performed using multiplanar multisequence technique. Additionally, a coronal STIR sequence through the entire pelvis was acquired.  COMPARISON: No prior studies are available for direct comparison at the time of this interpretation.  FINDINGS:  Osseous Structures: No significant degenerative changes are identified. There is no evidence for fractures or avascular necrosis. Dextroconvex scoliosis of the lumbar spine is present with associated degenerative changes. Mild degeneration of the sacroiliac joints is present bilaterally. Overall, the bone marrow signal is age-appropriate.  Effusion: No significant hip joint effusion is present.  Labrum: Degeneration of the left acetabulum is present with truncation of the left aspect.  Tendons: Mild gluteus minimus and medius tendinosis is present, without tears. The origin of the hamstring tendons is unremarkable. The iliopsoas tendon is unremarkable. No significant amount of fluid is present in the bursae.  There is no evidence for inguinal adenopathy. The sciatic nerve has a normal appearance.  IMPRESSION:   Degenerative changes of the left hip. Associated degeneration of the left acetabulum with truncation of the left aspect.  Dextroconvex scoliosis of the lumbar spine with associated degenerative changes.  Signed by: Tristan Calderon MD Signed Date: 11/20/2021 3:14 PM EST   AP and lateral x-rays of both hips with AP of the pelvis taken today demonstrate no fracture or dislocation.  They do demonstrate degenerative changes of both hips.

## 2024-09-20 NOTE — HISTORY OF PRESENT ILLNESS
[de-identified] : 69-year-old female presents for evaluation of chronic bilateral hip pain. Denies trauma or injury. She complains of constant pain over the lateral aspect of the hips, L>R, worse with sleeping on the left side. She has seen various orthopedists, has had physical therapy, NSAIDs, and cortisone injections (last March 2024) with some relief. She has had MRIs of both hips which we have for review.

## 2024-09-20 NOTE — DISCUSSION/SUMMARY
[de-identified] : The patient has radiographic evidence of osteoarthritis of both hips.  Clinically she seems to be most symptomatic of left hip trochanteric bursitis.  She has been given these diagnoses in the past and has had no relief from injections, physical therapy.  She does get some improvement from NSAIDs.  I have discussed the pathology and natural history with her in detail.  At this point she will continue taking NSAIDs.  She will speak with her primary care physician regarding how long she can take these medications and whether she is having any side effects.  She will be reevaluated as needed.

## 2024-11-12 ENCOUNTER — APPOINTMENT (OUTPATIENT)
Dept: ORTHOPEDIC SURGERY | Facility: CLINIC | Age: 69
End: 2024-11-12
Payer: MEDICARE

## 2024-11-12 VITALS — HEIGHT: 59 IN | WEIGHT: 97 LBS | BODY MASS INDEX: 19.56 KG/M2

## 2024-11-12 DIAGNOSIS — M65.311 TRIGGER THUMB, RIGHT THUMB: ICD-10-CM

## 2024-11-12 DIAGNOSIS — M65.312 TRIGGER THUMB, LEFT THUMB: ICD-10-CM

## 2024-11-12 DIAGNOSIS — M66.241 SPONTANEOUS RUPTURE OF EXTENSOR TENDONS, RIGHT HAND: ICD-10-CM

## 2024-11-12 PROCEDURE — 73140 X-RAY EXAM OF FINGER(S): CPT | Mod: 50

## 2024-11-12 PROCEDURE — 99215 OFFICE O/P EST HI 40 MIN: CPT

## 2025-09-03 ENCOUNTER — APPOINTMENT (OUTPATIENT)
Dept: ORTHOPEDIC SURGERY | Facility: CLINIC | Age: 70
End: 2025-09-03

## 2025-09-04 ENCOUNTER — APPOINTMENT (OUTPATIENT)
Dept: ORTHOPEDIC SURGERY | Facility: CLINIC | Age: 70
End: 2025-09-04
Payer: MEDICARE

## 2025-09-04 VITALS — BODY MASS INDEX: 19.56 KG/M2 | HEIGHT: 59 IN | WEIGHT: 97 LBS

## 2025-09-04 DIAGNOSIS — M76.892 OTHER SPECIFIED ENTHESOPATHIES OF LEFT LOWER LIMB, EXCLUDING FOOT: ICD-10-CM

## 2025-09-04 DIAGNOSIS — Z98.890 OTHER SPECIFIED POSTPROCEDURAL STATES: ICD-10-CM

## 2025-09-04 PROCEDURE — 73564 X-RAY EXAM KNEE 4 OR MORE: CPT | Mod: LT

## 2025-09-04 PROCEDURE — 99213 OFFICE O/P EST LOW 20 MIN: CPT
